# Patient Record
Sex: FEMALE | Race: WHITE | NOT HISPANIC OR LATINO | URBAN - NONMETROPOLITAN AREA
[De-identification: names, ages, dates, MRNs, and addresses within clinical notes are randomized per-mention and may not be internally consistent; named-entity substitution may affect disease eponyms.]

---

## 2020-09-14 ENCOUNTER — OFFICE VISIT (OUTPATIENT)
Dept: ORTHOPEDIC SURGERY | Facility: CLINIC | Age: 55
End: 2020-09-14

## 2020-09-14 VITALS — WEIGHT: 252.6 LBS | HEIGHT: 67 IN | RESPIRATION RATE: 18 BRPM | BODY MASS INDEX: 39.65 KG/M2

## 2020-09-14 DIAGNOSIS — M25.561 ARTHRALGIA OF BOTH KNEES: Primary | ICD-10-CM

## 2020-09-14 DIAGNOSIS — M25.562 ARTHRALGIA OF BOTH KNEES: Primary | ICD-10-CM

## 2020-09-14 DIAGNOSIS — M17.0 PRIMARY OSTEOARTHRITIS OF BOTH KNEES: ICD-10-CM

## 2020-09-14 PROCEDURE — 99203 OFFICE O/P NEW LOW 30 MIN: CPT | Performed by: PHYSICIAN ASSISTANT

## 2020-09-14 RX ORDER — TIZANIDINE HYDROCHLORIDE 4 MG/1
CAPSULE, GELATIN COATED ORAL
COMMUNITY
Start: 2020-07-20

## 2020-09-14 RX ORDER — DULOXETIN HYDROCHLORIDE 30 MG/1
CAPSULE, DELAYED RELEASE ORAL
COMMUNITY
Start: 2020-07-17 | End: 2021-04-01

## 2020-09-14 RX ORDER — LEVOTHYROXINE SODIUM 0.03 MG/1
TABLET ORAL
COMMUNITY
Start: 2020-07-17

## 2020-09-14 RX ORDER — DULOXETIN HYDROCHLORIDE 60 MG/1
CAPSULE, DELAYED RELEASE ORAL
COMMUNITY
Start: 2020-07-17

## 2020-09-14 RX ORDER — ERGOCALCIFEROL 1.25 MG/1
50000 CAPSULE ORAL WEEKLY
COMMUNITY

## 2020-09-14 RX ORDER — LISINOPRIL AND HYDROCHLOROTHIAZIDE 25; 20 MG/1; MG/1
TABLET ORAL DAILY
COMMUNITY
Start: 2020-07-17

## 2020-09-14 NOTE — PROGRESS NOTES
"Subjective   Patient ID: Joana Kumar is a 55 y.o.  female  Pain of the Left Knee and Pain of the Right Knee (Patient here today for bilateral knee pain x 10 years. Has had arthroscopic surgery on left knee, cortisone injections and gel injection series. )         History of Present Illness    Patient presents as a new patient with c/o chronic bilateral knee pain.   She has been experiencing bilateral knee pain for 10+ years.  She denies recent injury or trauma.  She states 2 years ago she received Visco supplementation injections at [facility] orth[facility] which helped with pain control until this past March.  She would like to repeat these if possible.        Past Medical History:   Diagnosis Date   • Asthma    • Depression    • Fibromyalgia    • Fracture of lumbar spine (CMS/[facility]) 1988    \"hairline\" fracture   • Fracture, foot     multiple toe fractures   • Fracture, rib     multiple   • History of concussion    • Hypertension    • Hypothyroidism    • Osteoarthritis    • Vitamin D deficiency    • Wrist fracture     right, two fractures at different times due to trauma        Past Surgical History:   Procedure Laterality Date   • CHOLECYSTECTOMY     • ELBOW OPEN REDUCTION INTERNAL FIXATION Right 2000    Dr. Jack Smith   • HYSTERECTOMY     • KNEE ARTHROSCOPY Left 2005    Dr. Elizabeth       Family History   Problem Relation Age of Onset   • Lupus Mother    • Osteoarthritis Mother    • Heart disease Mother    • Cancer Father         lung   • Heart attack Sister    • Cancer Maternal Grandmother    • Diabetes Maternal Grandmother    • Diabetes Paternal Grandfather    • Heart disease Paternal Grandfather        Social History     Socioeconomic History   • Marital status:      Spouse name: Not on file   • Number of children: Not on file   • Years of education: Not on file   • Highest education level: Not on file   Occupational History   • Occupation: disabled   Tobacco Use   • Smoking status: Never " Smoker   • Smokeless tobacco: Never Used   Substance and Sexual Activity   • Alcohol use: Yes     Frequency: Monthly or less   • Drug use: Never   Social History Narrative    Right hand dominant         Current Outpatient Medications:   •  Albuterol Sulfate (PROAIR HFA IN), Inhale., Disp: , Rfl:   •  AMITRIPTYLINE HCL PO, Take  by mouth., Disp: , Rfl:   •  DULoxetine (CYMBALTA) 30 MG capsule, TK ONE C PO QD FOR FIBROMYALGIA-COMBINE WITH 60MG, Disp: , Rfl:   •  DULoxetine (CYMBALTA) 60 MG capsule, TK ONE C PO QD FOR FIBROMYALGIA-COMBINE WITH 30MG, Disp: , Rfl:   •  ipratropium (ATROVENT HFA) 17 MCG/ACT inhaler, Inhale 2 puffs 4 (Four) Times a Day., Disp: , Rfl:   •  levothyroxine (SYNTHROID, LEVOTHROID) 25 MCG tablet, TK 1 T PO QD IN THE MORNING, Disp: , Rfl:   •  lisinopril-hydrochlorothiazide (PRINZIDE,ZESTORETIC) 20-25 MG per tablet, Take  by mouth Daily., Disp: , Rfl:   •  TiZANidine (ZANAFLEX) 4 MG capsule, TK 1 C PO TID, Disp: , Rfl:   •  vitamin D (ERGOCALCIFEROL) 1.25 MG (93561 UT) capsule capsule, Take 50,000 Units by mouth 1 (One) Time Per Week., Disp: , Rfl:     Allergies   Allergen Reactions   • Contrast Dye Swelling and Rash   • Penicillins Shortness Of Breath and Rash   • Theophyllines Other (See Comments)     shaking   • Keflex [Cephalexin] Rash       Review of Systems   Constitutional: Negative for fever.   HENT: Negative for dental problem and voice change.    Eyes: Negative for visual disturbance.   Respiratory: Negative for shortness of breath.    Cardiovascular: Negative for chest pain.   Gastrointestinal: Negative for abdominal pain.   Genitourinary: Negative for dysuria.   Musculoskeletal: Positive for arthralgias. Negative for gait problem and joint swelling.   Skin: Negative for rash.   Neurological: Negative for speech difficulty.   Hematological: Does not bruise/bleed easily.   Psychiatric/Behavioral: Negative for confusion.       I have reviewed the medical and surgical history, family  "history, social history, medications, and/or allergies, and the review of systems of this report.    Objective   Resp 18   Ht 170.2 cm (67\")   Wt 115 kg (252 lb 9.6 oz)   BMI 39.56 kg/m²    Physical Exam  Vitals signs and nursing note reviewed.   Constitutional:       Appearance: Normal appearance. She is well-developed and normal weight.   HENT:      Head: Normocephalic.   Cardiovascular:      Pulses: Normal pulses.   Pulmonary:      Effort: Pulmonary effort is normal.   Musculoskeletal:      Right knee: She exhibits no effusion, no deformity and no erythema. Tenderness found. Medial joint line and lateral joint line tenderness noted.      Left knee: She exhibits no ecchymosis and no deformity. Tenderness found. Medial joint line and lateral joint line tenderness noted.   Neurological:      General: No focal deficit present.      Mental Status: She is alert and oriented to person, place, and time.   Psychiatric:         Mood and Affect: Mood normal.       Right Knee Exam     Other   Effusion: no effusion present           Extremity DVT signs are negative on physical exam with negative Merle sign, no calf pain, no palpable cords and no skin tone change   Neurologic Exam     Mental Status   Oriented to person, place, and time.              Assessment/Plan   Independent Review of Radiographic Studies:    AP standing of both knees and lateral of the bilateral knee knee, Indication to evaluate joint condition, no comparison views or not available, shows evident severe end-stage varus tricompartment osteoarthritis.      Procedures       Joana was seen today for pain and pain.    Diagnoses and all orders for this visit:    Arthralgia of both knees  -     XR Knee 3 View Bilateral; Future    Primary osteoarthritis of both knees       Orthopedic activities reviewed and patient expressed appreciation  Discussion of orthopedic goals  Risk, benefits, and merits of treatment alternatives reviewed with the patient and " questions answered  Ice, heat, and/or modalities as beneficial    Recommendations/Plan:  Exercise, medications, injections, other patient advice, and return appointment as noted.  Patient is encouraged to call or return for any issues or concerns.  Patient would like to repeat viscosupplementation injection.  We did contact [facility] and discovered that she had Euflexxa injections in the past.  We will try to order these through specialty pharmacy  Patient agreeable to call or return sooner for any concerns.    Topical nsaid ointment               EMR Dragon-transcription disclaimer:  This encounter note is an electronic transcription of spoken language to printed text.  Electronic transcription of spoken language may permit erroneous or at times nonsensical words or phrases to be inadvertently transcribed.  Although I have reviewed the note for such errors, some may still exist

## 2020-09-15 DIAGNOSIS — M17.0 BILATERAL PRIMARY OSTEOARTHRITIS OF KNEE: Primary | ICD-10-CM

## 2020-09-15 RX ORDER — HYALURONATE SODIUM 10 MG/ML
20 SYRINGE (ML) INTRAARTICULAR WEEKLY
Status: DISCONTINUED | OUTPATIENT
Start: 2020-09-15 | End: 2020-09-24

## 2020-09-24 DIAGNOSIS — M17.0 BILATERAL PRIMARY OSTEOARTHRITIS OF KNEE: Primary | ICD-10-CM

## 2020-09-24 RX ORDER — HYALURONATE SODIUM 10 MG/ML
20 SYRINGE (ML) INTRAARTICULAR WEEKLY
Qty: 12 ML | Refills: 0 | Status: SHIPPED | OUTPATIENT
Start: 2020-09-24 | End: 2020-10-09

## 2020-10-06 ENCOUNTER — TELEPHONE (OUTPATIENT)
Dept: ORTHOPEDIC SURGERY | Facility: CLINIC | Age: 55
End: 2020-10-06

## 2020-10-06 NOTE — TELEPHONE ENCOUNTER
Tried calling patient to update status on Euflexxa injections. I had sent electronically a prior auth request and called  to check status. [facility] called [facility] and verbally sent in RX for Euflexxa after speaking to pharmacist at [facility], they were waiting on prior auth to send to specialty pharmacy so I just called in to [facility]

## 2020-10-20 DIAGNOSIS — M17.0 BILATERAL PRIMARY OSTEOARTHRITIS OF KNEE: Primary | ICD-10-CM

## 2020-10-20 RX ORDER — HYALURONATE SODIUM 10 MG/ML
20 SYRINGE (ML) INTRAARTICULAR WEEKLY
Qty: 6 SYRINGE | Refills: 0 | OUTPATIENT
Start: 2020-10-20 | End: 2020-11-04

## 2020-12-30 ENCOUNTER — TELEPHONE (OUTPATIENT)
Dept: ORTHOPEDIC SURGERY | Facility: CLINIC | Age: 55
End: 2020-12-30

## 2020-12-30 ENCOUNTER — OFFICE VISIT (OUTPATIENT)
Dept: ORTHOPEDIC SURGERY | Facility: CLINIC | Age: 55
End: 2020-12-30

## 2020-12-30 VITALS — RESPIRATION RATE: 18 BRPM | TEMPERATURE: 97.3 F | HEIGHT: 55 IN | WEIGHT: 258 LBS | BODY MASS INDEX: 59.71 KG/M2

## 2020-12-30 DIAGNOSIS — M25.562 ARTHRALGIA OF BOTH KNEES: Primary | ICD-10-CM

## 2020-12-30 DIAGNOSIS — M17.0 PRIMARY OSTEOARTHRITIS OF BOTH KNEES: Primary | ICD-10-CM

## 2020-12-30 DIAGNOSIS — M25.561 ARTHRALGIA OF BOTH KNEES: Primary | ICD-10-CM

## 2020-12-30 DIAGNOSIS — M17.0 PRIMARY OSTEOARTHRITIS OF BOTH KNEES: ICD-10-CM

## 2020-12-30 PROCEDURE — 20610 DRAIN/INJ JOINT/BURSA W/O US: CPT | Performed by: PHYSICIAN ASSISTANT

## 2020-12-30 RX ORDER — METHYLPREDNISOLONE 4 MG/1
TABLET ORAL
Qty: 21 TABLET | Refills: 0 | Status: SHIPPED | OUTPATIENT
Start: 2020-12-30 | End: 2021-01-13

## 2020-12-30 RX ORDER — MOMETASONE FUROATE 100 UG/1
AEROSOL RESPIRATORY (INHALATION) EVERY 12 HOURS SCHEDULED
COMMUNITY

## 2020-12-30 RX ORDER — DIPHENOXYLATE HYDROCHLORIDE AND ATROPINE SULFATE 2.5; .025 MG/1; MG/1
TABLET ORAL
COMMUNITY

## 2020-12-30 NOTE — TELEPHONE ENCOUNTER
I spoke with patient, she is coming in today to start bilateral Supartz injections, her insurance does not cover   Euflexxa or any other through specialty pharmacy. She is covered with buy and bill. She had documented allergy to eggs but had stated she had no reaction to them so she was instructed to get allergy tested and bring in documentation to proceed with Supartz, since it is gordo derived. She states she had allergy testing 12/29/20 and is bringing documentation that states negative allergy to eggs so she can proceed with Supartz

## 2020-12-30 NOTE — PROGRESS NOTES
"Subjective   Joana Kumar is a 55 y.o. female here today for injection therapy.    No chief complaint on file.  patient presents for bilateral knee injections-supartz  She states she did have allergy testing specific for egg allergy which was negative.     Past Medical History:   Diagnosis Date   • Asthma    • Depression    • Fibromyalgia    • Fracture of lumbar spine (CMS/HCC) 1988    \"hairline\" fracture   • Fracture, foot     multiple toe fractures   • Fracture, rib     multiple   • History of concussion    • Hypertension    • Hypothyroidism    • Osteoarthritis    • Vitamin D deficiency    • Wrist fracture     right, two fractures at different times due to trauma        Past Surgical History:   Procedure Laterality Date   • CHOLECYSTECTOMY     • ELBOW OPEN REDUCTION INTERNAL FIXATION Right 2000    Dr. Jack Smith   • HYSTERECTOMY     • KNEE ARTHROSCOPY Left 2005    Dr. Elizabeth       Allergies   Allergen Reactions   • Contrast Dye Swelling and Rash   • Penicillins Shortness Of Breath and Rash   • Theophyllines Other (See Comments)     shaking   • Keflex [Cephalexin] Rash   • Iodine Unknown - Low Severity   • Shellfish Allergy Swelling   • Tetanus Toxoid Unknown - Low Severity   • Tramadol Hcl Other (See Comments)   • Eggs Or Egg-Derived Products Unknown - Low Severity     Pt states she tested positive for allergy, although she states she eats eggs frequently w/o side effects       Objective   Temp 97.3 °F (36.3 °C)   Resp 18   Ht 67 cm (26.38\")   Wt 117 kg (258 lb)   .70 kg/m²    Physical Exam        Large Joint Arthrocentesis: R knee  Date/Time: 12/30/2020 9:44 AM  Consent given by: patient  Site marked: site marked  Timeout: Immediately prior to procedure a time out was called to verify the correct patient, procedure, equipment, support staff and site/side marked as required   Supporting Documentation  Indications: pain   Procedure Details  Location: knee - R knee  Preparation: Patient was " prepped and draped in the usual sterile fashion  Needle gauge: 21 G.  Approach: anterolateral  Medications administered: 25 mg sodium hyaluronate 25 MG/2.5ML  Patient tolerance: patient tolerated the procedure well with no immediate complications    Large Joint Arthrocentesis: L knee  Date/Time: 12/30/2020 9:44 AM  Consent given by: patient  Site marked: site marked  Timeout: Immediately prior to procedure a time out was called to verify the correct patient, procedure, equipment, support staff and site/side marked as required   Supporting Documentation  Indications: pain   Procedure Details  Location: knee - L knee  Preparation: Patient was prepped and draped in the usual sterile fashion  Needle gauge: 21 G.  Approach: anterolateral  Medications administered: 25 mg sodium hyaluronate 25 MG/2.5ML  Patient tolerance: patient tolerated the procedure well with no immediate complications          Assessment/Plan      Diagnosis Plan   1. Arthralgia of both knees  Large Joint Arthrocentesis: R knee    Large Joint Arthrocentesis: L knee   2. Primary osteoarthritis of both knees  Large Joint Arthrocentesis: R knee    Large Joint Arthrocentesis: L knee       No complications of injection noted.    Discussion of orthopaedic goals and activities and patient and/or guardian expressed appreciation. Call or notify for any adverse effect from injection therapy. Ice, heat, and/or modalities as beneficial. Watch for signs and symptoms of infection.    Recommendations:  Work/Activity Status: May perform usual activities as tolerated. May return to routine exercise and physical work as tolerated. No strenuous activity.  Patient and/or guardian is encouraged to call or return for any issues or concerns.    Return in about 1 week (around 1/6/2021) for Supartz #2/both knees/office supplied.  Patient agreeable to call or return sooner for any concerns.

## 2020-12-30 NOTE — TELEPHONE ENCOUNTER
Provider: NILES WOLFE    Caller: PATIENT    Relationship to Patient: SELF    Pharmacy: [facility]- [phone]    Phone Number: [phone]    Reason for Call: PT. STATES THAT SHE SAW ABDOULAYE THIS MORNING AND THAT HE WAS GOING TO SEND IN RX. FOR PREDNISONE, BUT HER PHARMACY HASN'T RECEIVED ANYTHING YET.   SHE IS ASKING IF THIS CAN BE SENT TO [facility] TODAY, PLEASE.

## 2021-01-06 ENCOUNTER — OFFICE VISIT (OUTPATIENT)
Dept: ORTHOPEDIC SURGERY | Facility: CLINIC | Age: 56
End: 2021-01-06

## 2021-01-06 VITALS — TEMPERATURE: 97.6 F | RESPIRATION RATE: 16 BRPM | BODY MASS INDEX: 40.49 KG/M2 | HEIGHT: 67 IN | WEIGHT: 258 LBS

## 2021-01-06 DIAGNOSIS — M17.0 PRIMARY OSTEOARTHRITIS OF BOTH KNEES: Primary | ICD-10-CM

## 2021-01-06 PROCEDURE — 20610 DRAIN/INJ JOINT/BURSA W/O US: CPT | Performed by: PHYSICIAN ASSISTANT

## 2021-01-06 NOTE — PROGRESS NOTES
"Subjective   Joana Kumar is a 55 y.o. female here today for injection therapy.    Chief Complaint   Patient presents with   • Left Knee - Follow-up     Here for Supartz #2 injections of both knees.   • Right Knee - Follow-up     Patient presents for visco injections.   Past Medical History:   Diagnosis Date   • Asthma    • Depression    • Fibromyalgia    • Fracture of lumbar spine (CMS/[facility]) 1988    \"hairline\" fracture   • Fracture, foot     multiple toe fractures   • Fracture, rib     multiple   • History of concussion    • Hypertension    • Hypothyroidism    • Osteoarthritis    • Vitamin D deficiency    • Wrist fracture     right, two fractures at different times due to trauma         Past Surgical History:   Procedure Laterality Date   • CHOLECYSTECTOMY     • ELBOW OPEN REDUCTION INTERNAL FIXATION Right 2000    Dr. Jack Smith   • HYSTERECTOMY     • KNEE ARTHROSCOPY Left 2005    Dr. Elizabeth       Allergies   Allergen Reactions   • Contrast Dye Swelling and Rash   • Penicillins Shortness Of Breath and Rash   • Theophyllines Other (See Comments)     shaking   • Keflex [Cephalexin] Rash   • Iodine Unknown - Low Severity   • Shellfish Allergy Swelling   • Tetanus Toxoid Unknown - Low Severity   • Tramadol Hcl Other (See Comments)   • Eggs Or Egg-Derived Products Unknown - Low Severity     Pt states she tested positive for allergy, although she states she eats eggs frequently w/o side effects       Objective   Temp 97.6 °F (36.4 °C)   Resp 16   Ht 170.2 cm (67\")   Wt 117 kg (258 lb)   BMI 40.41 kg/m²    Physical Exam    Skin exam stable with no erythema, ecchymosis or rash.  No new swelling.  No motor or sensory changes.  Distal pulse intact.    Large Joint Arthrocentesis: R knee  Date/Time: 1/6/2021 9:34 AM  Consent given by: patient  Site marked: site marked  Timeout: Immediately prior to procedure a time out was called to verify the correct patient, procedure, equipment, support staff and site/side marked " as required   Supporting Documentation  Indications: pain   Procedure Details  Location: knee - R knee  Preparation: Patient was prepped and draped in the usual sterile fashion  Needle size: 22 G  Approach: anterolateral  Medications administered: 25 mg sodium hyaluronate 25 MG/2.5ML  Patient tolerance: patient tolerated the procedure well with no immediate complications    Large Joint Arthrocentesis: L knee  Date/Time: 1/6/2021 9:36 AM  Consent given by: patient  Site marked: site marked  Timeout: Immediately prior to procedure a time out was called to verify the correct patient, procedure, equipment, support staff and site/side marked as required   Supporting Documentation  Indications: pain   Procedure Details  Location: knee - L knee  Preparation: Patient was prepped and draped in the usual sterile fashion  Needle size: 22 G  Approach: anterolateral  Medications administered: 25 mg sodium hyaluronate 25 MG/2.5ML  Patient tolerance: patient tolerated the procedure well with no immediate complications          Assessment/Plan      Diagnosis Plan   1. Primary osteoarthritis of both knees  Large Joint Arthrocentesis: R knee    Large Joint Arthrocentesis: L knee       Guided on proper techniques for mobility, strength, agility and/or conditioning exercises  Ice, heat, and/or modalities as beneficial  Watch for signs and symptoms of infection  Call or notify for any adverse effect from injection therapy    Recommendations:  Patient agreeable to call or return sooner for any concerns.

## 2021-01-13 ENCOUNTER — OFFICE VISIT (OUTPATIENT)
Dept: ORTHOPEDIC SURGERY | Facility: CLINIC | Age: 56
End: 2021-01-13

## 2021-01-13 VITALS — HEIGHT: 67 IN | WEIGHT: 258 LBS | BODY MASS INDEX: 40.49 KG/M2 | RESPIRATION RATE: 18 BRPM

## 2021-01-13 DIAGNOSIS — M17.11 PRIMARY LOCALIZED OSTEOARTHRITIS OF RIGHT KNEE: Primary | ICD-10-CM

## 2021-01-13 DIAGNOSIS — M17.12 PRIMARY OSTEOARTHRITIS OF LEFT KNEE: ICD-10-CM

## 2021-01-13 PROCEDURE — 20610 DRAIN/INJ JOINT/BURSA W/O US: CPT | Performed by: PHYSICIAN ASSISTANT

## 2021-01-13 RX ORDER — AZELASTINE 1 MG/ML
SPRAY, METERED NASAL
COMMUNITY
Start: 2020-12-29

## 2021-01-13 RX ORDER — EPINEPHRINE 0.3 MG/.3ML
INJECTION SUBCUTANEOUS
COMMUNITY

## 2021-01-13 NOTE — PROGRESS NOTES
"Migel Kumar is a 55 y.o. female here today for injection therapy.    Chief Complaint   Patient presents with   • Left Knee - Injections   • Right Knee - Injections     Bilateral Supartz # 3       Patient presents for third bilateral Supartz injection.  Past Medical History:   Diagnosis Date   • Asthma    • Depression    • Fibromyalgia    • Fracture of lumbar spine (CMS/HCC) 1988    \"hairline\" fracture   • Fracture, foot     multiple toe fractures   • Fracture, rib     multiple   • History of concussion    • Hypertension    • Hypothyroidism    • Osteoarthritis    • Vitamin D deficiency    • Wrist fracture     right, two fractures at different times due to trauma         Past Surgical History:   Procedure Laterality Date   • CHOLECYSTECTOMY     • ELBOW OPEN REDUCTION INTERNAL FIXATION Right 2000    Dr. Jack Smith   • HYSTERECTOMY     • KNEE ARTHROSCOPY Left 2005    Dr. Elizabeth       Allergies   Allergen Reactions   • Contrast Dye Swelling and Rash   • Penicillins Shortness Of Breath and Rash   • Theophyllines Other (See Comments)     shaking   • Keflex [Cephalexin] Rash   • Iodine Unknown - Low Severity   • Shellfish Allergy Swelling   • Tetanus Toxoid Unknown - Low Severity   • Tramadol Hcl Other (See Comments)   • Eggs Or Egg-Derived Products Unknown - Low Severity     Pt states she tested positive for allergy, although she states she eats eggs frequently w/o side effects       Objective   Resp 18   Ht 170.2 cm (67\")   Wt 117 kg (258 lb)   BMI 40.41 kg/m²    Physical Exam    Skin exam stable with no erythema, ecchymosis or rash.  No new swelling.  No motor or sensory changes.  Distal pulse intact.    Large Joint Arthrocentesis: R knee  Date/Time: 1/13/2021 9:36 AM  Consent given by: patient  Site marked: site marked  Timeout: Immediately prior to procedure a time out was called to verify the correct patient, procedure, equipment, support staff and site/side marked as required   Supporting " Documentation  Indications: pain   Procedure Details  Location: knee - R knee  Preparation: Patient was prepped and draped in the usual sterile fashion  Needle gauge: 21g.  Approach: anterolateral  Medications administered: 25 mg sodium hyaluronate 25 MG/2.5ML  Patient tolerance: patient tolerated the procedure well with no immediate complications    Large Joint Arthrocentesis: L knee  Date/Time: 1/13/2021 9:37 AM  Consent given by: patient  Site marked: site marked  Timeout: Immediately prior to procedure a time out was called to verify the correct patient, procedure, equipment, support staff and site/side marked as required   Supporting Documentation  Indications: pain   Procedure Details  Location: knee - L knee  Preparation: Patient was prepped and draped in the usual sterile fashion  Needle gauge: 21g.  Approach: anterolateral  Medications administered: 25 mg sodium hyaluronate 25 MG/2.5ML  Patient tolerance: patient tolerated the procedure well with no immediate complications          Assessment/Plan      Diagnosis Plan   1. Primary localized osteoarthritis of right knee  Large Joint Arthrocentesis: R knee   2. Primary osteoarthritis of left knee  Large Joint Arthrocentesis: L knee       Guided on proper techniques for mobility, strength, agility and/or conditioning exercises  Ice, heat, and/or modalities as beneficial  Watch for signs and symptoms of infection  Call or notify for any adverse effect from injection therapy    Recommendations:  light physical work as tolerated,  Follow-up in 1 week  Patient agreeable to call or return sooner for any concerns.

## 2021-01-20 ENCOUNTER — OFFICE VISIT (OUTPATIENT)
Dept: ORTHOPEDIC SURGERY | Facility: CLINIC | Age: 56
End: 2021-01-20

## 2021-01-20 VITALS — BODY MASS INDEX: 40.49 KG/M2 | HEIGHT: 67 IN | RESPIRATION RATE: 20 BRPM | TEMPERATURE: 97.3 F | WEIGHT: 258 LBS

## 2021-01-20 DIAGNOSIS — M17.11 PRIMARY LOCALIZED OSTEOARTHRITIS OF RIGHT KNEE: Primary | ICD-10-CM

## 2021-01-20 DIAGNOSIS — M17.12 PRIMARY OSTEOARTHRITIS OF LEFT KNEE: ICD-10-CM

## 2021-01-20 PROCEDURE — 20610 DRAIN/INJ JOINT/BURSA W/O US: CPT | Performed by: PHYSICIAN ASSISTANT

## 2021-01-20 NOTE — PROGRESS NOTES
"Migel Kumar is a 55 y.o. female here today for injection therapy.    Chief Complaint   Patient presents with   • Left Knee - Injections     4th Supartz inj   • Right Knee - Injections     inj 4th supartz     Patient presents for repeat bilateral knee viscosupplementation injections.  Past Medical History:   Diagnosis Date   • Asthma    • Depression    • Fibromyalgia    • Fracture of lumbar spine (CMS/[facility]) 1988    \"hairline\" fracture   • Fracture, foot     multiple toe fractures   • Fracture, rib     multiple   • History of concussion    • Hypertension    • Hypothyroidism    • Osteoarthritis    • Vitamin D deficiency    • Wrist fracture     right, two fractures at different times due to trauma         Past Surgical History:   Procedure Laterality Date   • CHOLECYSTECTOMY     • ELBOW OPEN REDUCTION INTERNAL FIXATION Right 2000    Dr. Jack Smith   • HYSTERECTOMY     • KNEE ARTHROSCOPY Left 2005    Dr. Elizabeth       Allergies   Allergen Reactions   • Contrast Dye Swelling and Rash   • Penicillins Shortness Of Breath and Rash   • Theophyllines Other (See Comments)     shaking   • Keflex [Cephalexin] Rash   • Iodine Unknown - Low Severity   • Shellfish Allergy Swelling   • Tetanus Toxoid Unknown - Low Severity   • Tramadol Hcl Other (See Comments)   • Eggs Or Egg-Derived Products Unknown - Low Severity     Pt states she tested positive for allergy, although she states she eats eggs frequently w/o side effects       Objective   Temp 97.3 °F (36.3 °C)   Resp 20   Ht 170.2 cm (67.01\")   Wt 117 kg (258 lb)   BMI 40.40 kg/m²    Physical Exam    Skin exam stable with no erythema, ecchymosis or rash.  No new swelling.  No motor or sensory changes.  Distal pulse intact.    Large Joint Arthrocentesis: R knee  Date/Time: 1/20/2021 9:41 AM  Consent given by: patient  Site marked: site marked  Timeout: Immediately prior to procedure a time out was called to verify the correct patient, procedure, equipment, " support staff and site/side marked as required   Supporting Documentation  Indications: pain   Procedure Details  Location: knee - R knee  Preparation: Patient was prepped and draped in the usual sterile fashion  Needle gauge: 21 G.  Approach: anterolateral  Medications administered: 25 mg sodium hyaluronate 25 MG/2.5ML  Patient tolerance: patient tolerated the procedure well with no immediate complications    Large Joint Arthrocentesis: L knee  Date/Time: 1/20/2021 9:43 AM  Consent given by: patient  Site marked: site marked  Timeout: Immediately prior to procedure a time out was called to verify the correct patient, procedure, equipment, support staff and site/side marked as required   Supporting Documentation  Indications: pain   Procedure Details  Location: knee - L knee  Preparation: Patient was prepped and draped in the usual sterile fashion  Needle gauge: 21 G.  Approach: anterolateral  Medications administered: 25 mg sodium hyaluronate 25 MG/2.5ML  Patient tolerance: patient tolerated the procedure well with no immediate complications          Assessment/Plan      Diagnosis Plan   1. Primary localized osteoarthritis of right knee  Large Joint Arthrocentesis: R knee   2. Primary osteoarthritis of left knee  Large Joint Arthrocentesis: L knee       Discussion of orthopaedic goals and activities and patient and/or guardian expressed appreciation.  Guided on proper techniques for mobility, strength, agility and/or conditioning exercises  Ice, heat, and/or modalities as beneficial  Watch for signs and symptoms of infection  Call or notify for any adverse effect from injection therapy    Recommendations:  Follow-up in 1 week  Patient agreeable to call or return sooner for any concerns.

## 2021-01-27 ENCOUNTER — OFFICE VISIT (OUTPATIENT)
Dept: ORTHOPEDIC SURGERY | Facility: CLINIC | Age: 56
End: 2021-01-27

## 2021-01-27 VITALS — BODY MASS INDEX: 40.49 KG/M2 | WEIGHT: 258 LBS | TEMPERATURE: 98.1 F | RESPIRATION RATE: 16 BRPM | HEIGHT: 67 IN

## 2021-01-27 DIAGNOSIS — M17.11 PRIMARY LOCALIZED OSTEOARTHRITIS OF RIGHT KNEE: Primary | ICD-10-CM

## 2021-01-27 DIAGNOSIS — M17.12 PRIMARY OSTEOARTHRITIS OF LEFT KNEE: ICD-10-CM

## 2021-01-27 PROCEDURE — 20610 DRAIN/INJ JOINT/BURSA W/O US: CPT | Performed by: PHYSICIAN ASSISTANT

## 2021-01-27 NOTE — PROGRESS NOTES
"Migel Kumar is a 55 y.o. female here today for injection therapy.    Chief Complaint   Patient presents with   • Right Knee - Follow-up     Here for #5 Supartz injections.   • Left Knee - Follow-up   Patient presents for repeat bilateral knee viscosupplementation injections.    Past Medical History:   Diagnosis Date   • Asthma    • Depression    • Fibromyalgia    • Fracture of lumbar spine (CMS/[facility]) 1988    \"hairline\" fracture   • Fracture, foot     multiple toe fractures   • Fracture, rib     multiple   • History of concussion    • Hypertension    • Hypothyroidism    • Osteoarthritis    • Vitamin D deficiency    • Wrist fracture     right, two fractures at different times due to trauma         Past Surgical History:   Procedure Laterality Date   • CHOLECYSTECTOMY     • ELBOW OPEN REDUCTION INTERNAL FIXATION Right 2000    Dr. Jack Smith   • HYSTERECTOMY     • KNEE ARTHROSCOPY Left 2005    Dr. Elizabeth       Allergies   Allergen Reactions   • Contrast Dye Swelling and Rash   • Penicillins Shortness Of Breath and Rash   • Theophyllines Other (See Comments)     shaking   • Keflex [Cephalexin] Rash   • Iodine Unknown - Low Severity   • Shellfish Allergy Swelling   • Tetanus Toxoid Unknown - Low Severity   • Tramadol Hcl Other (See Comments)   • Eggs Or Egg-Derived Products Unknown - Low Severity     Pt states she tested positive for allergy, although she states she eats eggs frequently w/o side effects       Objective   Temp 98.1 °F (36.7 °C)   Resp 16   Ht 170.2 cm (67\")   Wt 117 kg (258 lb)   BMI 40.41 kg/m²    Physical Exam    Skin exam stable with no erythema, ecchymosis or rash.  No new swelling.  No motor or sensory changes.  Distal pulse intact.    Large Joint Arthrocentesis: R knee  Date/Time: 1/27/2021 9:40 AM  Consent given by: patient  Site marked: site marked  Timeout: Immediately prior to procedure a time out was called to verify the correct patient, procedure, equipment, support " staff and site/side marked as required   Supporting Documentation  Indications: pain   Procedure Details  Location: knee - R knee  Preparation: Patient was prepped and draped in the usual sterile fashion  Needle size: 22 G  Approach: anteromedial  Medications administered: 25 mg sodium hyaluronate 25 MG/2.5ML  Patient tolerance: patient tolerated the procedure well with no immediate complications    Large Joint Arthrocentesis: L knee  Date/Time: 1/27/2021 9:41 AM  Consent given by: patient  Site marked: site marked  Timeout: Immediately prior to procedure a time out was called to verify the correct patient, procedure, equipment, support staff and site/side marked as required   Supporting Documentation  Indications: pain   Procedure Details  Location: knee - L knee  Preparation: Patient was prepped and draped in the usual sterile fashion  Needle size: 22 G  Approach: anteromedial  Medications administered: 25 mg sodium hyaluronate 25 MG/2.5ML  Patient tolerance: patient tolerated the procedure well with no immediate complications          Assessment/Plan      Diagnosis Plan   1. Primary localized osteoarthritis of right knee  Large Joint Arthrocentesis: R knee    Large Joint Arthrocentesis: L knee   2. Primary osteoarthritis of left knee         Discussion of orthopaedic goals and activities and patient and/or guardian expressed appreciation.  Guided on proper techniques for mobility, strength, agility and/or conditioning exercises  Ice, heat, and/or modalities as beneficial  Watch for signs and symptoms of infection  Call or notify for any adverse effect from injection therapy    Recommendations:    Follow up as needed    Patient agreeable to call or return sooner for any concerns.

## 2021-04-01 ENCOUNTER — OFFICE VISIT (OUTPATIENT)
Dept: ORTHOPEDIC SURGERY | Facility: CLINIC | Age: 56
End: 2021-04-01

## 2021-04-01 DIAGNOSIS — M17.0 PRIMARY OSTEOARTHRITIS OF BOTH KNEES: ICD-10-CM

## 2021-04-01 PROCEDURE — 20610 DRAIN/INJ JOINT/BURSA W/O US: CPT | Performed by: PHYSICIAN ASSISTANT

## 2021-04-01 RX ORDER — METHYLPREDNISOLONE ACETATE 40 MG/ML
40 INJECTION, SUSPENSION INTRA-ARTICULAR; INTRALESIONAL; INTRAMUSCULAR; SOFT TISSUE
Status: COMPLETED | OUTPATIENT
Start: 2021-04-01 | End: 2021-04-01

## 2021-04-01 RX ADMIN — METHYLPREDNISOLONE ACETATE 40 MG: 40 INJECTION, SUSPENSION INTRA-ARTICULAR; INTRALESIONAL; INTRAMUSCULAR; SOFT TISSUE at 08:32

## 2021-04-01 RX ADMIN — METHYLPREDNISOLONE ACETATE 40 MG: 40 INJECTION, SUSPENSION INTRA-ARTICULAR; INTRALESIONAL; INTRAMUSCULAR; SOFT TISSUE at 08:35

## 2021-04-01 NOTE — PROGRESS NOTES
"Subjective   Joana Kumar is a 55 y.o. female here today for injection therapy.    Chief Complaint   Patient presents with   • Right Knee - Pain     Injection therapy for bilateral knee pain. Had Supartz #5 01/27/2021. Requesting bilateral cortisone today before leaving for vacation.    • Left Knee - Pain   patient would like bilateral knee cortisone injections.      Past Medical History:   Diagnosis Date   • Asthma    • Depression    • Fibromyalgia    • Fracture of lumbar spine (CMS/[facility]) 1988    \"hairline\" fracture   • Fracture, foot     multiple toe fractures   • Fracture, rib     multiple   • History of concussion    • Hypertension    • Hypothyroidism    • Osteoarthritis    • Vitamin D deficiency    • Wrist fracture     right, two fractures at different times due to trauma         Past Surgical History:   Procedure Laterality Date   • CHOLECYSTECTOMY     • ELBOW OPEN REDUCTION INTERNAL FIXATION Right 2000    Dr. Jack Smith   • HYSTERECTOMY     • KNEE ARTHROSCOPY Left 2005    Dr. Elizabeth       Allergies   Allergen Reactions   • Contrast Dye Swelling and Rash   • Penicillins Shortness Of Breath and Rash   • Theophyllines Other (See Comments)     shaking   • Keflex [Cephalexin] Rash   • Iodine Unknown - Low Severity   • Shellfish Allergy Swelling   • Tetanus Toxoid Unknown - Low Severity   • Tramadol Hcl Other (See Comments)   • Eggs Or Egg-Derived Products Unknown - Low Severity     Pt states she tested positive for allergy, although she states she eats eggs frequently w/o side effects       Objective   There were no vitals taken for this visit.   Physical Exam    Skin exam stable with no erythema, ecchymosis or rash.  No new swelling.  No motor or sensory changes.  Distal pulse intact.    Large Joint Arthrocentesis: R knee  Date/Time: 4/1/2021 8:32 AM  Consent given by: patient  Site marked: site marked  Timeout: Immediately prior to procedure a time out was called to verify the correct patient, procedure, " equipment, support staff and site/side marked as required   Supporting Documentation  Indications: pain   Procedure Details  Location: knee - R knee  Preparation: Patient was prepped and draped in the usual sterile fashion  Needle size: 22 G  Approach: anterolateral  Medications administered: 40 mg methylPREDNISolone acetate 40 MG/ML  Patient tolerance: patient tolerated the procedure well with no immediate complications    Large Joint Arthrocentesis: L knee  Date/Time: 4/1/2021 8:35 AM  Consent given by: patient  Site marked: site marked  Timeout: Immediately prior to procedure a time out was called to verify the correct patient, procedure, equipment, support staff and site/side marked as required   Supporting Documentation  Indications: pain   Procedure Details  Location: knee - L knee  Preparation: Patient was prepped and draped in the usual sterile fashion  Needle size: 22 G  Approach: anterolateral  Medications administered: 40 mg methylPREDNISolone acetate 40 MG/ML  Patient tolerance: patient tolerated the procedure well with no immediate complications          Assessment/Plan      Diagnosis Plan   1. Primary osteoarthritis of both knees         Discussion of orthopaedic goals and activities and patient and/or guardian expressed appreciation.  Guided on proper techniques for mobility, strength, agility and/or conditioning exercises  Ice, heat, and/or modalities as beneficial  Watch for signs and symptoms of infection  Call or notify for any adverse effect from injection therapy    Recommendations:  Follow up in 6 months    Patient agreeable to call or return sooner for any concerns.

## 2022-02-21 ENCOUNTER — TELEPHONE (OUTPATIENT)
Dept: ORTHOPEDIC SURGERY | Facility: CLINIC | Age: 57
End: 2022-02-21

## 2022-02-21 NOTE — TELEPHONE ENCOUNTER
Caller: DANICA MALDONADO     Relationship to patient: SELF     Best call back number:[phone]    Chief complaint: BILATERAL KNEE PAIN     Type of visit: INJECTION     Requested date: ASAP IN MORNING   Additional notes:LAST SEEN 04/2021

## 2022-03-03 ENCOUNTER — OFFICE VISIT (OUTPATIENT)
Dept: ORTHOPEDIC SURGERY | Facility: CLINIC | Age: 57
End: 2022-03-03

## 2022-03-03 VITALS — HEIGHT: 67 IN | BODY MASS INDEX: 38.77 KG/M2 | WEIGHT: 247 LBS

## 2022-03-03 DIAGNOSIS — M17.0 PRIMARY OSTEOARTHRITIS OF BOTH KNEES: Primary | ICD-10-CM

## 2022-03-03 PROCEDURE — 20610 DRAIN/INJ JOINT/BURSA W/O US: CPT | Performed by: PHYSICIAN ASSISTANT

## 2022-03-03 RX ORDER — PREDNISONE 10 MG/1
TABLET ORAL SEE ADMIN INSTRUCTIONS
COMMUNITY
Start: 2022-01-21 | End: 2022-03-24

## 2022-03-03 RX ORDER — DEXTROMETHORPHAN HYDROBROMIDE AND PROMETHAZINE HYDROCHLORIDE 15; 6.25 MG/5ML; MG/5ML
5 SYRUP ORAL EVERY 4 HOURS
COMMUNITY

## 2022-03-03 RX ORDER — ONDANSETRON 8 MG/1
TABLET, ORALLY DISINTEGRATING ORAL EVERY 8 HOURS
COMMUNITY

## 2022-03-03 RX ORDER — DOXYCYCLINE HYCLATE 100 MG/1
CAPSULE ORAL EVERY 12 HOURS SCHEDULED
COMMUNITY

## 2022-03-03 NOTE — PROGRESS NOTES
"Migel Kumar is a 56 y.o. female here today for injection therapy.    Chief Complaint   Patient presents with   • Right Knee - Injections     supartz #1.   • Left Knee - Injections     Patient presents for bilateral knee Visco injections    Past Medical History:   Diagnosis Date   • Asthma    • Depression    • Fibromyalgia    • Fracture of lumbar spine (HCC) 1988    \"hairline\" fracture   • Fracture, foot     multiple toe fractures   • Fracture, rib     multiple   • History of concussion    • Hypertension    • Hypothyroidism    • Osteoarthritis    • Vitamin D deficiency    • Wrist fracture     right, two fractures at different times due to trauma         Past Surgical History:   Procedure Laterality Date   • CHOLECYSTECTOMY     • ELBOW OPEN REDUCTION INTERNAL FIXATION Right 2000    Dr. Jack Smith   • HYSTERECTOMY     • KNEE ARTHROSCOPY Left 2005    Dr. Elizabeth       Allergies   Allergen Reactions   • Contrast Dye Swelling and Rash   • Penicillins Shortness Of Breath and Rash   • Theophyllines Other (See Comments)     shaking   • Keflex [Cephalexin] Rash   • Iodine Unknown - Low Severity   • Shellfish Allergy Swelling   • Tetanus Toxoid Unknown - Low Severity   • Tramadol Hcl Other (See Comments)   • Eggs Or Egg-Derived Products Unknown - Low Severity     Pt states she tested positive for allergy, although she states she eats eggs frequently w/o side effects       Objective   Ht 170.2 cm (67.01\")   Wt 112 kg (247 lb)   BMI 38.68 kg/m²    Physical Exam    Skin exam stable with no erythema, ecchymosis or rash.  No new swelling.  No motor or sensory changes.  Distal pulse intact.    Large Joint Arthrocentesis: R knee  Date/Time: 3/3/2022 12:59 PM  Consent given by: patient  Site marked: site marked  Timeout: Immediately prior to procedure a time out was called to verify the correct patient, procedure, equipment, support staff and site/side marked as required   Supporting Documentation  Indications: " pain   Procedure Details  Location: knee - R knee  Preparation: Patient was prepped and draped in the usual sterile fashion  Needle size: 22 G  Medications administered: 25 mg sodium hyaluronate 25 MG/2.5ML  Patient tolerance: patient tolerated the procedure well with no immediate complications    Large Joint Arthrocentesis: L knee  Date/Time: 3/3/2022 12:59 PM  Consent given by: patient  Site marked: site marked  Timeout: Immediately prior to procedure a time out was called to verify the correct patient, procedure, equipment, support staff and site/side marked as required   Supporting Documentation  Indications: pain   Procedure Details  Location: knee - L knee  Preparation: Patient was prepped and draped in the usual sterile fashion  Needle size: 22 G  Approach: anterolateral  Medications administered: 25 mg sodium hyaluronate 25 MG/2.5ML  Patient tolerance: patient tolerated the procedure well with no immediate complications          Assessment/Plan      Diagnosis Plan   1. Primary osteoarthritis of both knees         Guided on proper techniques for mobility, strength, agility and/or conditioning exercises  Ice, heat, and/or modalities as beneficial  Watch for signs and symptoms of infection  Call or notify for any adverse effect from injection therapy    Recommendations:  Follow up in 1 week    Patient agreeable to call or return sooner for any concerns.

## 2022-03-10 ENCOUNTER — OFFICE VISIT (OUTPATIENT)
Dept: ORTHOPEDIC SURGERY | Facility: CLINIC | Age: 57
End: 2022-03-10

## 2022-03-10 VITALS — HEIGHT: 67 IN | TEMPERATURE: 98.4 F | WEIGHT: 247.6 LBS | BODY MASS INDEX: 38.86 KG/M2

## 2022-03-10 DIAGNOSIS — M17.0 PRIMARY OSTEOARTHRITIS OF BOTH KNEES: Primary | ICD-10-CM

## 2022-03-10 PROCEDURE — 20610 DRAIN/INJ JOINT/BURSA W/O US: CPT | Performed by: PHYSICIAN ASSISTANT

## 2022-03-10 RX ORDER — ALBUTEROL SULFATE 90 UG/1
2 AEROSOL, METERED RESPIRATORY (INHALATION) 4 TIMES DAILY PRN
COMMUNITY
Start: 2022-03-03

## 2022-03-10 RX ORDER — LORATADINE 10 MG/1
10 TABLET ORAL DAILY
COMMUNITY
Start: 2022-03-03

## 2022-03-10 RX ORDER — AMITRIPTYLINE HYDROCHLORIDE 25 MG/1
TABLET, FILM COATED ORAL
COMMUNITY
Start: 2022-03-03

## 2022-03-10 NOTE — PROGRESS NOTES
"Migel Kumar is a 56 y.o. female here today for injection therapy.    Chief Complaint   Patient presents with   • Right Knee - Injections     Supartz #2.   • Left Knee - Injections     Patient presents for repeat bilateral knee visco injection # 2  Past Medical History:   Diagnosis Date   • Asthma    • Depression    • Fibromyalgia    • Fracture of lumbar spine (HCC) 1988    \"hairline\" fracture   • Fracture, foot     multiple toe fractures   • Fracture, rib     multiple   • History of concussion    • Hypertension    • Hypothyroidism    • Osteoarthritis    • Vitamin D deficiency    • Wrist fracture     right, two fractures at different times due to trauma         Past Surgical History:   Procedure Laterality Date   • CHOLECYSTECTOMY     • ELBOW OPEN REDUCTION INTERNAL FIXATION Right 2000    Dr. Jack Smith   • HYSTERECTOMY     • KNEE ARTHROSCOPY Left 2005    Dr. Elizabeth       Allergies   Allergen Reactions   • Contrast Dye Swelling and Rash   • Penicillins Shortness Of Breath and Rash   • Theophyllines Other (See Comments)     shaking   • Keflex [Cephalexin] Rash   • Iodine Unknown - Low Severity   • Shellfish Allergy Swelling   • Tetanus Toxoid Unknown - Low Severity   • Tramadol Hcl Other (See Comments)   • Eggs Or Egg-Derived Products Unknown - Low Severity     Pt states she tested positive for allergy, although she states she eats eggs frequently w/o side effects       Objective   Temp 98.4 °F (36.9 °C)   Ht 170.2 cm (67.01\")   Wt 112 kg (247 lb 9.6 oz)   BMI 38.77 kg/m²    Physical Exam    Skin exam stable with no erythema, ecchymosis or rash.  No new swelling.  No motor or sensory changes.  Distal pulse intact.    Large Joint Arthrocentesis: R knee  Date/Time: 3/10/2022 9:40 AM  Consent given by: patient  Site marked: site marked  Timeout: Immediately prior to procedure a time out was called to verify the correct patient, procedure, equipment, support staff and site/side marked as required "   Supporting Documentation  Indications: pain   Procedure Details  Location: knee - R knee  Preparation: Patient was prepped and draped in the usual sterile fashion  Needle size: 22 G  Approach: anterolateral  Medications administered: 25 mg sodium hyaluronate 25 MG/2.5ML  Patient tolerance: patient tolerated the procedure well with no immediate complications    Large Joint Arthrocentesis: L knee  Date/Time: 3/10/2022 9:40 AM  Consent given by: patient  Site marked: site marked  Timeout: Immediately prior to procedure a time out was called to verify the correct patient, procedure, equipment, support staff and site/side marked as required   Supporting Documentation  Indications: pain   Procedure Details  Location: knee - L knee  Preparation: Patient was prepped and draped in the usual sterile fashion  Needle size: 22 G  Approach: anterolateral  Medications administered: 25 mg sodium hyaluronate 25 MG/2.5ML  Patient tolerance: patient tolerated the procedure well with no immediate complications          Assessment/Plan      Diagnosis Plan   1. Primary osteoarthritis of both knees         Discussion of orthopaedic goals and activities and patient and/or guardian expressed appreciation.  Guided on proper techniques for mobility, strength, agility and/or conditioning exercises  Ice, heat, and/or modalities as beneficial  Watch for signs and symptoms of infection  Call or notify for any adverse effect from injection therapy    Recommendations:  Follow up in 1 week    Patient agreeable to call or return sooner for any concerns.

## 2022-03-17 ENCOUNTER — OFFICE VISIT (OUTPATIENT)
Dept: ORTHOPEDIC SURGERY | Facility: CLINIC | Age: 57
End: 2022-03-17

## 2022-03-17 VITALS — HEIGHT: 67 IN | RESPIRATION RATE: 18 BRPM | BODY MASS INDEX: 38.61 KG/M2 | WEIGHT: 246 LBS

## 2022-03-17 DIAGNOSIS — M17.0 PRIMARY OSTEOARTHRITIS OF BOTH KNEES: Primary | ICD-10-CM

## 2022-03-17 PROCEDURE — 20610 DRAIN/INJ JOINT/BURSA W/O US: CPT | Performed by: PHYSICIAN ASSISTANT

## 2022-03-17 NOTE — PROGRESS NOTES
"Subjective   Joana Kumar is a 56 y.o. female here today for injection therapy.    Chief Complaint   Patient presents with   • Left Knee - Follow-up, Injections   • Right Knee - Injections     Bilateral Supartz # 3     Patient presents for repeat breanna. Knee visco injections    Past Medical History:   Diagnosis Date   • Asthma    • Depression    • Fibromyalgia    • Fracture of lumbar spine (HCC) 1988    \"hairline\" fracture   • Fracture, foot     multiple toe fractures   • Fracture, rib     multiple   • History of concussion    • Hypertension    • Hypothyroidism    • Osteoarthritis    • Vitamin D deficiency    • Wrist fracture     right, two fractures at different times due to trauma         Past Surgical History:   Procedure Laterality Date   • CHOLECYSTECTOMY     • ELBOW OPEN REDUCTION INTERNAL FIXATION Right 2000    Dr. Jack Smith   • HYSTERECTOMY     • KNEE ARTHROSCOPY Left 2005    Dr. Elizabeth       Allergies   Allergen Reactions   • Contrast Dye Swelling and Rash   • Penicillins Shortness Of Breath and Rash   • Theophyllines Other (See Comments)     shaking   • Keflex [Cephalexin] Rash   • Iodine Unknown - Low Severity   • Shellfish Allergy Swelling   • Tetanus Toxoid Unknown - Low Severity   • Tramadol Hcl Other (See Comments)   • Eggs Or Egg-Derived Products Unknown - Low Severity     Pt states she tested positive for allergy, although she states she eats eggs frequently w/o side effects       Objective   Resp 18   Ht 170.2 cm (67.01\")   Wt 112 kg (246 lb)   BMI 38.52 kg/m²    Physical Exam    Skin exam stable with no erythema, ecchymosis or rash.  No new swelling.  No motor or sensory changes.  Distal pulse intact.    Large Joint Arthrocentesis: R knee  Date/Time: 3/17/2022 11:17 AM  Consent given by: patient  Site marked: site marked  Timeout: Immediately prior to procedure a time out was called to verify the correct patient, procedure, equipment, support staff and site/side marked as required "   Supporting Documentation  Indications: pain   Procedure Details  Location: knee - R knee  Preparation: Patient was prepped and draped in the usual sterile fashion  Needle size: 22 G  Approach: anterolateral  Medications administered: 25 mg sodium hyaluronate 25 MG/2.5ML  Patient tolerance: patient tolerated the procedure well with no immediate complications    Large Joint Arthrocentesis: L knee  Date/Time: 3/17/2022 11:17 AM  Consent given by: patient  Site marked: site marked  Timeout: Immediately prior to procedure a time out was called to verify the correct patient, procedure, equipment, support staff and site/side marked as required   Supporting Documentation  Indications: pain   Procedure Details  Location: knee - L knee  Preparation: Patient was prepped and draped in the usual sterile fashion  Needle size: 22 G  Approach: anterolateral  Medications administered: 25 mg sodium hyaluronate 25 MG/2.5ML  Patient tolerance: patient tolerated the procedure well with no immediate complications          Assessment/Plan      Diagnosis Plan   1. Primary osteoarthritis of both knees         Discussion of orthopaedic goals and activities and patient and/or guardian expressed appreciation.  Guided on proper techniques for mobility, strength, agility and/or conditioning exercises  Ice, heat, and/or modalities as beneficial  Watch for signs and symptoms of infection  Call or notify for any adverse effect from injection therapy    Recommendations:    Patient agreeable to call or return sooner for any concerns.

## 2022-03-24 ENCOUNTER — OFFICE VISIT (OUTPATIENT)
Dept: ORTHOPEDIC SURGERY | Facility: CLINIC | Age: 57
End: 2022-03-24

## 2022-03-24 VITALS — BODY MASS INDEX: 38.61 KG/M2 | RESPIRATION RATE: 18 BRPM | WEIGHT: 246 LBS | HEIGHT: 67 IN

## 2022-03-24 DIAGNOSIS — M17.0 PRIMARY OSTEOARTHRITIS OF BOTH KNEES: Primary | ICD-10-CM

## 2022-03-24 PROCEDURE — 20610 DRAIN/INJ JOINT/BURSA W/O US: CPT | Performed by: PHYSICIAN ASSISTANT

## 2022-03-24 NOTE — PROGRESS NOTES
"Subjective   Joana Kumar is a 56 y.o. female here today for injection therapy.    Chief Complaint   Patient presents with   • Left Knee - Injections   • Right Knee - Injections     Sunil Supartz # 4     Patient presents for bilateral knee visco injection #4  Past Medical History:   Diagnosis Date   • Asthma    • Depression    • Fibromyalgia    • Fracture of lumbar spine (HCC) 1988    \"hairline\" fracture   • Fracture, foot     multiple toe fractures   • Fracture, rib     multiple   • History of concussion    • Hypertension    • Hypothyroidism    • Osteoarthritis    • Vitamin D deficiency    • Wrist fracture     right, two fractures at different times due to trauma         Past Surgical History:   Procedure Laterality Date   • CHOLECYSTECTOMY     • ELBOW OPEN REDUCTION INTERNAL FIXATION Right 2000    Dr. Jack Smith   • HYSTERECTOMY     • KNEE ARTHROSCOPY Left 2005    Dr. Elizabeth       Allergies   Allergen Reactions   • Contrast Dye Swelling and Rash   • Penicillins Shortness Of Breath and Rash   • Theophyllines Other (See Comments)     shaking   • Keflex [Cephalexin] Rash   • Iodine Unknown - Low Severity   • Shellfish Allergy Swelling   • Tetanus Toxoid Unknown - Low Severity   • Tramadol Hcl Other (See Comments)   • Eggs Or Egg-Derived Products Unknown - Low Severity     Pt states she tested positive for allergy, although she states she eats eggs frequently w/o side effects       Objective   Resp 18   Ht 170.2 cm (67.01\")   Wt 112 kg (246 lb)   BMI 38.52 kg/m²    Physical Exam    Skin exam stable with no erythema, ecchymosis or rash.  No new swelling.  No motor or sensory changes.  Distal pulse intact.    Large Joint Arthrocentesis: R knee  Date/Time: 3/24/2022 10:00 AM  Consent given by: patient  Site marked: site marked  Timeout: Immediately prior to procedure a time out was called to verify the correct patient, procedure, equipment, support staff and site/side marked as required   Supporting " Documentation  Indications: pain   Procedure Details  Location: knee - R knee  Preparation: Patient was prepped and draped in the usual sterile fashion  Needle size: 22 G  Approach: anterolateral  Medications administered: 25 mg sodium hyaluronate 25 MG/2.5ML  Patient tolerance: patient tolerated the procedure well with no immediate complications    Large Joint Arthrocentesis: L knee  Date/Time: 3/24/2022 10:02 AM  Consent given by: patient  Site marked: site marked  Timeout: Immediately prior to procedure a time out was called to verify the correct patient, procedure, equipment, support staff and site/side marked as required   Supporting Documentation  Indications: pain   Procedure Details  Location: knee - L knee  Preparation: Patient was prepped and draped in the usual sterile fashion  Needle size: 22 G  Approach: anterolateral  Medications administered: 25 mg sodium hyaluronate 25 MG/2.5ML  Patient tolerance: patient tolerated the procedure well with no immediate complications          Assessment/Plan      Diagnosis Plan   1. Primary osteoarthritis of both knees         Discussion of orthopaedic goals and activities and patient and/or guardian expressed appreciation.  Guided on proper techniques for mobility, strength, agility and/or conditioning exercises  Ice, heat, and/or modalities as beneficial  Watch for signs and symptoms of infection  Call or notify for any adverse effect from injection therapy    Recommendations:  Follow up in 1 week      Patient agreeable to call or return sooner for any concerns.

## 2022-03-31 ENCOUNTER — OFFICE VISIT (OUTPATIENT)
Dept: ORTHOPEDIC SURGERY | Facility: CLINIC | Age: 57
End: 2022-03-31

## 2022-03-31 VITALS — HEIGHT: 67 IN | BODY MASS INDEX: 38.61 KG/M2 | TEMPERATURE: 97.5 F | WEIGHT: 246 LBS

## 2022-03-31 DIAGNOSIS — M25.561 ARTHRALGIA OF BOTH KNEES: ICD-10-CM

## 2022-03-31 DIAGNOSIS — M25.562 ARTHRALGIA OF BOTH KNEES: ICD-10-CM

## 2022-03-31 DIAGNOSIS — M17.0 PRIMARY OSTEOARTHRITIS OF BOTH KNEES: Primary | ICD-10-CM

## 2022-03-31 PROCEDURE — 20610 DRAIN/INJ JOINT/BURSA W/O US: CPT | Performed by: PHYSICIAN ASSISTANT

## 2022-03-31 NOTE — PROGRESS NOTES
"Migel Kumar is a 56 y.o. female here today for injection therapy.    Chief Complaint   Patient presents with   • Left Knee - Injections     Supartz #5   • Right Knee - Injections     Supartz #5     Patient presents for bilateral knee visco injection #5  Past Medical History:   Diagnosis Date   • Asthma    • Depression    • Fibromyalgia    • Fracture of lumbar spine (HCC) 1988    \"hairline\" fracture   • Fracture, foot     multiple toe fractures   • Fracture, rib     multiple   • History of concussion    • Hypertension    • Hypothyroidism    • Osteoarthritis    • Vitamin D deficiency    • Wrist fracture     right, two fractures at different times due to trauma         Past Surgical History:   Procedure Laterality Date   • CHOLECYSTECTOMY     • ELBOW OPEN REDUCTION INTERNAL FIXATION Right 2000    Dr. Jack Smith   • HYSTERECTOMY     • KNEE ARTHROSCOPY Left 2005    Dr. Elizabeth       Allergies   Allergen Reactions   • Contrast Dye Swelling and Rash   • Penicillins Shortness Of Breath and Rash   • Theophyllines Other (See Comments)     shaking   • Keflex [Cephalexin] Rash   • Iodine Unknown - Low Severity   • Shellfish Allergy Swelling   • Tetanus Toxoid Unknown - Low Severity   • Tramadol Hcl Other (See Comments)   • Eggs Or Egg-Derived Products Unknown - Low Severity     Pt states she tested positive for allergy, although she states she eats eggs frequently w/o side effects       Objective   Temp 97.5 °F (36.4 °C)   Ht 170.2 cm (67.01\")   Wt 112 kg (246 lb)   BMI 38.52 kg/m²    Physical Exam    Skin exam stable with no erythema, ecchymosis or rash.  No new swelling.  No motor or sensory changes.  Distal pulse intact.    Large Joint Arthrocentesis: R knee  Date/Time: 3/31/2022 9:23 AM  Consent given by: patient  Site marked: site marked  Timeout: Immediately prior to procedure a time out was called to verify the correct patient, procedure, equipment, support staff and site/side marked as required "   Supporting Documentation  Indications: pain   Procedure Details  Location: knee - R knee  Preparation: Patient was prepped and draped in the usual sterile fashion  Needle size: 22 G  Approach: anterolateral  Medications administered: 25 mg sodium hyaluronate 25 MG/2.5ML  Patient tolerance: patient tolerated the procedure well with no immediate complications    Large Joint Arthrocentesis: L knee  Date/Time: 3/31/2022 9:23 AM  Consent given by: patient  Site marked: site marked  Timeout: Immediately prior to procedure a time out was called to verify the correct patient, procedure, equipment, support staff and site/side marked as required   Supporting Documentation  Indications: pain   Procedure Details  Location: knee - L knee  Preparation: Patient was prepped and draped in the usual sterile fashion  Needle size: 22 G  Approach: anterolateral  Medications administered: 25 mg sodium hyaluronate 25 MG/2.5ML  Patient tolerance: patient tolerated the procedure well with no immediate complications          Assessment/Plan      Diagnosis Plan   1. Primary osteoarthritis of both knees     2. Arthralgia of both knees         Discussion of orthopaedic goals and activities and patient and/or guardian expressed appreciation.  Guided on proper techniques for mobility, strength, agility and/or conditioning exercises  Ice, heat, and/or modalities as beneficial  Watch for signs and symptoms of infection  Call or notify for any adverse effect from injection therapy    Recommendations:  Follow up in 6 months or prn    Patient agreeable to call or return sooner for any concerns.